# Patient Record
Sex: MALE | Race: WHITE | NOT HISPANIC OR LATINO | Employment: FULL TIME | URBAN - NONMETROPOLITAN AREA
[De-identification: names, ages, dates, MRNs, and addresses within clinical notes are randomized per-mention and may not be internally consistent; named-entity substitution may affect disease eponyms.]

---

## 2021-03-22 ENCOUNTER — OFFICE VISIT (OUTPATIENT)
Dept: OTOLARYNGOLOGY | Facility: CLINIC | Age: 41
End: 2021-03-22

## 2021-03-22 ENCOUNTER — PROCEDURE VISIT (OUTPATIENT)
Dept: OTOLARYNGOLOGY | Facility: CLINIC | Age: 41
End: 2021-03-22

## 2021-03-22 VITALS — HEIGHT: 72 IN | WEIGHT: 206.8 LBS | TEMPERATURE: 98 F | BODY MASS INDEX: 28.01 KG/M2

## 2021-03-22 DIAGNOSIS — H90.3 SENSORINEURAL HEARING LOSS (SNHL) OF BOTH EARS: ICD-10-CM

## 2021-03-22 DIAGNOSIS — H92.03 OTALGIA OF BOTH EARS: Primary | ICD-10-CM

## 2021-03-22 DIAGNOSIS — H92.03 OTALGIA OF BOTH EARS: ICD-10-CM

## 2021-03-22 DIAGNOSIS — H93.13 TINNITUS OF BOTH EARS: ICD-10-CM

## 2021-03-22 DIAGNOSIS — H69.83 DYSFUNCTION OF BOTH EUSTACHIAN TUBES: Primary | ICD-10-CM

## 2021-03-22 DIAGNOSIS — J30.9 ALLERGIC RHINITIS, UNSPECIFIED SEASONALITY, UNSPECIFIED TRIGGER: ICD-10-CM

## 2021-03-22 PROCEDURE — 99204 OFFICE O/P NEW MOD 45 MIN: CPT | Performed by: NURSE PRACTITIONER

## 2021-03-22 RX ORDER — AZELASTINE HYDROCHLORIDE, FLUTICASONE PROPIONATE 137; 50 UG/1; UG/1
2 SPRAY, METERED NASAL 2 TIMES DAILY
Qty: 23 G | Refills: 1 | Status: SHIPPED | OUTPATIENT
Start: 2021-03-22 | End: 2021-04-21

## 2021-03-22 RX ORDER — PREDNISOLONE 15 MG/5ML
SOLUTION ORAL
COMMUNITY
Start: 2021-02-12 | End: 2021-07-03

## 2021-03-22 RX ORDER — AMOXICILLIN AND CLAVULANATE POTASSIUM 875; 125 MG/1; MG/1
TABLET, FILM COATED ORAL
COMMUNITY
Start: 2021-03-16 | End: 2021-07-03

## 2021-04-12 PROBLEM — H90.3 SENSORINEURAL HEARING LOSS (SNHL) OF BOTH EARS: Status: ACTIVE | Noted: 2021-04-12

## 2021-04-12 PROBLEM — H93.13 TINNITUS OF BOTH EARS: Status: ACTIVE | Noted: 2021-04-12

## 2024-12-30 ENCOUNTER — OFFICE VISIT (OUTPATIENT)
Age: 44
End: 2024-12-30
Payer: COMMERCIAL

## 2024-12-30 VITALS — HEIGHT: 73 IN | BODY MASS INDEX: 28.36 KG/M2 | WEIGHT: 214 LBS

## 2024-12-30 DIAGNOSIS — M25.511 RIGHT SHOULDER PAIN, UNSPECIFIED CHRONICITY: Primary | ICD-10-CM

## 2024-12-30 DIAGNOSIS — M67.911 TENDINOPATHY OF RIGHT ROTATOR CUFF: ICD-10-CM

## 2024-12-30 PROCEDURE — 20610 DRAIN/INJ JOINT/BURSA W/O US: CPT | Performed by: PHYSICIAN ASSISTANT

## 2024-12-30 PROCEDURE — 99203 OFFICE O/P NEW LOW 30 MIN: CPT | Performed by: PHYSICIAN ASSISTANT

## 2024-12-30 RX ORDER — BETAMETHASONE SODIUM PHOSPHATE AND BETAMETHASONE ACETATE 3; 3 MG/ML; MG/ML
6 INJECTION, SUSPENSION INTRA-ARTICULAR; INTRALESIONAL; INTRAMUSCULAR; SOFT TISSUE ONCE
Status: COMPLETED | OUTPATIENT
Start: 2024-12-30 | End: 2024-12-30

## 2024-12-30 RX ORDER — BUPIVACAINE HYDROCHLORIDE 5 MG/ML
3 INJECTION, SOLUTION EPIDURAL; INTRACAUDAL ONCE
Status: COMPLETED | OUTPATIENT
Start: 2024-12-30 | End: 2024-12-30

## 2024-12-30 RX ORDER — LIDOCAINE HYDROCHLORIDE 10 MG/ML
1 INJECTION, SOLUTION INFILTRATION; PERINEURAL ONCE
Status: COMPLETED | OUTPATIENT
Start: 2024-12-30 | End: 2024-12-30

## 2024-12-30 RX ORDER — DICLOFENAC SODIUM 75 MG/1
TABLET, DELAYED RELEASE ORAL
COMMUNITY
Start: 2024-12-23

## 2024-12-30 RX ADMIN — BUPIVACAINE HYDROCHLORIDE 15 MG: 5 INJECTION, SOLUTION EPIDURAL; INTRACAUDAL at 13:58

## 2024-12-30 RX ADMIN — BETAMETHASONE SODIUM PHOSPHATE AND BETAMETHASONE ACETATE 6 MG: 3; 3 INJECTION, SUSPENSION INTRA-ARTICULAR; INTRALESIONAL; INTRAMUSCULAR; SOFT TISSUE at 13:58

## 2024-12-30 RX ADMIN — LIDOCAINE HYDROCHLORIDE 1 ML: 10 INJECTION, SOLUTION INFILTRATION; PERINEURAL at 13:59

## 2025-06-13 ENCOUNTER — OFFICE VISIT (OUTPATIENT)
Age: 45
End: 2025-06-13
Payer: COMMERCIAL

## 2025-06-13 DIAGNOSIS — M67.911 TENDINOPATHY OF RIGHT ROTATOR CUFF: Primary | ICD-10-CM

## 2025-06-13 PROCEDURE — 99213 OFFICE O/P EST LOW 20 MIN: CPT | Performed by: PHYSICIAN ASSISTANT

## 2025-06-13 RX ORDER — AZELASTINE HYDROCHLORIDE, FLUTICASONE PROPIONATE 137; 50 UG/1; UG/1
SPRAY, METERED NASAL
COMMUNITY

## 2025-06-13 RX ORDER — EPINEPHRINE 0.3 MG/.3ML
INJECTION SUBCUTANEOUS
COMMUNITY

## 2025-06-13 NOTE — PROGRESS NOTES
Orders Placed This Encounter    Azelastine-Fluticasone 137-50 MCG/ACT SUSP     Sig: SPRAY 1 PUFF IN THE NOSTRILS TWICE A DAY    EPINEPHrine (EPIPEN) 0.3 MG/0.3ML SOAJ injection         Electronically signed by Davey Yañez PA-C on 6/13/2025 at 8:03 AM.    Dragon Disclaimer:   This note was dictated with voice recognition software.  Though review and corrections are routine, we apologize for any errors.